# Patient Record
Sex: FEMALE | Race: WHITE
[De-identification: names, ages, dates, MRNs, and addresses within clinical notes are randomized per-mention and may not be internally consistent; named-entity substitution may affect disease eponyms.]

---

## 2017-01-10 ENCOUNTER — HOSPITAL ENCOUNTER (OUTPATIENT)
Dept: HOSPITAL 80 - FIMAGING | Age: 42
End: 2017-01-10
Attending: OBSTETRICS & GYNECOLOGY
Payer: COMMERCIAL

## 2017-01-10 DIAGNOSIS — E03.9: ICD-10-CM

## 2017-01-10 DIAGNOSIS — O09.522: Primary | ICD-10-CM

## 2017-01-10 DIAGNOSIS — O99.282: ICD-10-CM

## 2017-01-10 DIAGNOSIS — Z3A.20: ICD-10-CM

## 2017-01-10 NOTE — US
Dear Dr. Brady,

 

Thank you for sending your patient, Deanna Parker, to us for an US and consultation to assess fetal a
natomy. As you know, the patient is a 41 y.o. G4,  at 20 weeks and 3 days with an EDC of 5/27/17
 based on LMP and 10 week US. Her pregnancy is complicated by AMA>40 and hypothyroidism on Synthroid 
50 mcg daily. 

 

Genetic Screening: NIPT and AFP reassuring

 

The patient denies any uterine contractions, vaginal bleeding, or loss of fluid. Today, she is withou
t complaints.  

 

US FINDINGS:

 

Number of fetuses: 1

Placental location: Anterior, No previa

Placental Cord Insertion: Central

Fetal presentation: Breech

Cervix: 4.4 cm, transabdominally

MVP: 4.7 cm

The adnexa were evaluated. No pathology was seen.  

Right ovary: Normal

Left ovary: Suboptimal

 

Fetal heart rate:  143 bpm

 

Measurements:

 

Biparietal diameter: 48 mm, 20 weeks 4 days

Head circumference: 181 mm, 20 weeks 4 days

Abdominal circumference: 155 mm, 20 weeks 5 days

Femur length: 32 mm, 20 weeks 1 days

Humerus length: 32 mm, 20 weeks 4 days

Transcerebellar diameter: 23 mm, 21 weeks 5 days

 

Average ultrasound age: 20 weeks 4 days

 

Estimated fetal weight: 353 g

Fetal weight percentile: 45%

 

FETAL ANATOMY

 

Supratentorial brain: Normal

Cerebral lateral ventricle: 5 mm

Posterior fossa: Normal

Cisterna magna: 4 mm

Nuchal fold: 3.0 mm

Lip: Normal

Profile: Normal

Alveolar Ridge: Suboptimal

Spine:

-- Cervical: Normal

-- Thoracic: Normal

-- Lumbar: Normal

-- Sacral: Suboptimal

Heart:  

-- 4 Chamber: Normal

-- Intraventricular septum: Appears intact by Color and Spectral US

-- Right Outflow Tract: Normal

-- Left Outflow Tract: Normal

-- 3 Vessel View: Suboptimal

-- Aortic Arch: Normal

-- Ductal Arch: Normal

Diaphragm: Appears intact 

Stomach: Normal

Abdominal Umbilical Cord Insertion: Normal

Right kidney: Normal

Left kidney: Normal

Bladder: Normal

Number of cord vessels: 3

Upper extremities: 

-- Right Arm: Normal

-- Right Hand: Normal

-- Left Arm: Normal

-- Left Hand: Suboptimal

Lower extremities: 

-- Right Leg: Normal

-- Right Foot: Normal, no club foot

-- Left Leg: Normal

-- Left Foot: Normal, no club foot

 

IMPRESSION:

 

1.  Anatomy: The fetus measures appropriate for gestational age, measuring a normal weight and percen
tile. Visualization of the fetus today reveals no overt structural anomalies. However, the sacral spi
ne, 3VV, palate, and left hands were not well seen secondary to fetal position. There is evidence of 
normal amniotic fluid, and fetal movement was seen during the examination. 

- US in 4-6 weeks to reevaluate suboptimal anatomy

 

2. Genetic Screening: This patient has had reassuring NIPT results this pregnancy. Today, no markers 
of fetal aneuploidy were seen. While her screening and US results are reassuring, we reviewed that fe
koki aneuploidy can only be definitively excluded with diagnostic testing via amniocentesis. After thi
s discussion, the patient does not wish to proceed with invasive testing at this time.

 

3. Maternal Age >40 at Time of Delivery: We discussed the increased risk of preeclampsia, gestational
 diabetes, IUGR, and term IUFD. 

- Growth US at 28-32 weeks

- Weekly NSTs and fluid checks starting at 36 weeks

- Delivery by 40+0 weeks given term IUFD risk

- Consider early Glucola with repeat at 26-28 weeks if normal

- Close monitoring of blood pressures and urine protein in the third trimester

 

Thank you again for sending this patient to see us today. Approximately 12 minutes of a total visit t
bhupendra of 15 minutes were spent with this patient today in direct face to face counseling regarding yusuf
gaurav's US findings and the above recommendations.  

 

Please feel free to contact me with any questions at [phone].

 

Meghan Montes De Oca MD 

Maternal-Fetal Medicine

## 2017-01-11 NOTE — US
OB sonogram

 

History: HU May 27, 2017, 20 weeks 3 days, check growth and anatomy, advanced maternal age, maternal
 thyroid dysfunction on Synthroid

 

Comparison: none

 

Findings: There is a single viable intrauterine gestation in breech presentation. The cervix is close
d, measuring 4.4 cm transabdominally. The placenta is anterior without previa. The 3 vessel umbilical
 cord inserts normally into the central placenta. Maximum amniotic fluid pocket = 4.7 cm. The materna
l right ovary is normal the maternal left ovary is not visualized.

 

The visualized fetal face, intracranial contents and fetal spine look normal. The fetal heart is 4 ch
ambered and has a rate of 143 beats per minute. The right and left ventricular outflow tracks are nor
mal. Fluid is identified in the fetal stomach and fetal urinary bladder. Fetal renal region looks nor
mal. The umbilical cord insertion site is normal. Four fetal extremities are present.

 

BPD = 40 mm = 20 weeks 4 days

Head circumference = 181 mm = 20 weeks 4 days

Abdominal circumference = 155 mm = 20 weeks 5 days

Femur length = 32 mm = 20 weeks 1 day

Humeral length = 32 mm = 20 weeks 4 days

Cerebellar width = 23 mm = 21 weeks 5 days

Cisterna magna = 4.4 mm

Estimated fetal weight = 45 percentile

 

Impression: The fetus is in breech presentation. Size consistent with dates. Normal fetal anatomy.

 

This report should be read in conjunction with the consultation by Dr. Meghan Montes De Oca.

## 2017-02-15 ENCOUNTER — HOSPITAL ENCOUNTER (OUTPATIENT)
Dept: HOSPITAL 80 - FLD | Age: 42
Setting detail: OBSERVATION
Discharge: HOME | End: 2017-02-15
Attending: OBSTETRICS & GYNECOLOGY | Admitting: OBSTETRICS & GYNECOLOGY
Payer: COMMERCIAL

## 2017-02-15 DIAGNOSIS — R10.9: ICD-10-CM

## 2017-02-15 DIAGNOSIS — O9A.212: Primary | ICD-10-CM

## 2017-02-15 DIAGNOSIS — O09.522: ICD-10-CM

## 2017-02-15 DIAGNOSIS — Z3A.23: ICD-10-CM

## 2017-02-15 LAB
% IMMATURE GRANULYOCYTES: 1.2 % (ref 0–1.1)
ABSOLUTE IMMATURE GRANULOCYTES: 0.14 10^3/UL (ref 0–0.1)
ABSOLUTE NRBC COUNT: 0 10^3/UL (ref 0–0.01)
ADD DIFF?: NO
ADD MORPH?: NO
ADD SCAN?: NO
ATYPICAL LYMPHOCYTE FLAG: 0 (ref 0–99)
ERYTHROCYTE [DISTWIDTH] IN BLOOD BY AUTOMATED COUNT: 13.6 % (ref 11.5–15.2)
FRAGMENT RBC FLAG: 0 (ref 0–99)
HCT VFR BLD CALC: 34.3 % (ref 38–47)
HGB BLD-MCNC: 11.8 G/DL (ref 12.6–16.3)
LEFT SHIFT FLG: 10 (ref 0–99)
LIPEMIA HEMOLYSIS FLAG: 90 (ref 0–99)
MCH RBC BLDCO QN: 31.4 PG (ref 27.9–34.1)
MCHC RBC AUTO-ENTMCNC: 34.4 G/DL (ref 32.4–36.7)
MCV RBC AUTO: 91.2 FL (ref 81.5–99.8)
NRBC-AUTO%: 0 % (ref 0–0.2)
PLATELET # BLD: 195 10^3/UL (ref 150–400)
PLATELET CLUMPS FLAG: 0 (ref 0–99)
PMV BLD AUTO: 10.5 FL (ref 8.7–11.7)
RBC # BLD AUTO: 3.76 10^6/UL (ref 4.18–5.33)

## 2017-02-15 PROCEDURE — 76815 OB US LIMITED FETUS(S): CPT

## 2017-02-15 PROCEDURE — G0378 HOSPITAL OBSERVATION PER HR: HCPCS

## 2017-02-15 NOTE — SOAPPROG
SOAP Progress Note


Assessment/Plan: 


Assessment:


40 yo P1 @ 25 weeks gestation, s/p minor abdominal trauma, several hours ago, 

doing well.























Plan:





02/15/17 18:08


FWB reassuring by US and fetal monitoring.  Will call if KB results are positive

, however, if positive, management plan would continue the same which is 

monitor for vaginal bleeding, abdominal pain, contractions, loss of fluid.  

Plan on f/u appointment in two days.  


Subjective: 





41 P1 @ 25 weeks presents for fetal monitoring after tripping and falling this 

morning.  She hit her knee and elbow and head as well as the side of her 

abdomen.  The fetus has been moving throughout the day, she has had no vaginal 

bleeding or loss of fluid or abdominal pain.  She does state her abdomen feels 

sore on the side.  No major complications with this pregnancy other than 

history of recurrent pregnancy loss and advanced maternal age.


Objective: 





 Laboratory Results





 02/15/17 16:19 





VSS


[place]-no contractions


FHTs 150s, moderate variability, +accels, no decels





Physical Exam





- Physical Exam


General Appearance: no apparent distress


Abdomen: non-tender, soft, other (gravid)





ICD10 Worksheet


Patient Problems: 


 Problems











Problem Status Onset


 


AMA (advanced maternal age) multigravida 35+ Acute  


 


GBS (group B Streptococcus carrier), +RV culture, currently pregnant Acute  


 


Oligohydramnios Acute

## 2017-02-23 ENCOUNTER — HOSPITAL ENCOUNTER (OUTPATIENT)
Dept: HOSPITAL 80 - FIMAGING | Age: 42
End: 2017-02-23
Attending: OBSTETRICS & GYNECOLOGY
Payer: COMMERCIAL

## 2017-02-23 DIAGNOSIS — E03.9: ICD-10-CM

## 2017-02-23 DIAGNOSIS — O09.522: Primary | ICD-10-CM

## 2017-02-23 DIAGNOSIS — Z3A.26: ICD-10-CM

## 2017-04-11 ENCOUNTER — HOSPITAL ENCOUNTER (OUTPATIENT)
Dept: HOSPITAL 80 - FIMAGING | Age: 42
End: 2017-04-11
Attending: OBSTETRICS & GYNECOLOGY
Payer: COMMERCIAL

## 2017-04-11 DIAGNOSIS — O99.283: Primary | ICD-10-CM

## 2017-04-11 DIAGNOSIS — E03.9: ICD-10-CM

## 2017-04-11 DIAGNOSIS — O09.523: ICD-10-CM

## 2017-04-11 DIAGNOSIS — Z3A.33: ICD-10-CM

## 2017-05-22 ENCOUNTER — HOSPITAL ENCOUNTER (INPATIENT)
Dept: HOSPITAL 80 - FLD | Age: 42
LOS: 2 days | Discharge: HOME | End: 2017-05-24
Attending: OBSTETRICS & GYNECOLOGY | Admitting: OBSTETRICS & GYNECOLOGY
Payer: COMMERCIAL

## 2017-05-22 DIAGNOSIS — Z3A.39: ICD-10-CM

## 2017-05-22 DIAGNOSIS — O99.820: ICD-10-CM

## 2017-05-22 LAB
% IMMATURE GRANULYOCYTES: 1.5 % (ref 0–1.1)
ABSOLUTE IMMATURE GRANULOCYTES: 0.16 10^3/UL (ref 0–0.1)
ABSOLUTE NRBC COUNT: 0 10^3/UL (ref 0–0.01)
ADD DIFF?: NO
ADD MORPH?: NO
ADD SCAN?: NO
ATYPICAL LYMPHOCYTE FLAG: 10 (ref 0–99)
ERYTHROCYTE [DISTWIDTH] IN BLOOD BY AUTOMATED COUNT: 14.4 % (ref 11.5–15.2)
FRAGMENT RBC FLAG: 0 (ref 0–99)
HCT VFR BLD CALC: 37.3 % (ref 38–47)
HGB BLD-MCNC: 12.9 G/DL (ref 12.6–16.3)
LEFT SHIFT FLG: 0 (ref 0–99)
LIPEMIA HEMOLYSIS FLAG: 90 (ref 0–99)
MCH RBC BLDCO QN: 31.9 PG (ref 27.9–34.1)
MCHC RBC AUTO-ENTMCNC: 34.6 G/DL (ref 32.4–36.7)
MCV RBC AUTO: 92.1 FL (ref 81.5–99.8)
NRBC-AUTO%: 0 % (ref 0–0.2)
PLATELET # BLD: 122 10^3/UL (ref 150–400)
PLATELET CLUMPS FLAG: 10 (ref 0–99)
PMV BLD AUTO: 12.6 FL (ref 8.7–11.7)
RBC # BLD AUTO: 4.05 10^6/UL (ref 4.18–5.33)

## 2017-05-22 RX ADMIN — AMPICILLIN SODIUM SCH: 1 INJECTION, POWDER, FOR SOLUTION INTRAMUSCULAR; INTRAVENOUS at 20:01

## 2017-05-22 RX ADMIN — AMPICILLIN SODIUM SCH MLS: 2 INJECTION, POWDER, FOR SOLUTION INTRAMUSCULAR; INTRAVENOUS at 22:14

## 2017-05-22 RX ADMIN — IBUPROFEN PRN MG: 600 TABLET ORAL at 16:11

## 2017-05-22 NOTE — OBPROG
OBG Progress Note


Assessment/Plan: 


Assessment:


42 yo  @ 39 2, IOL for AMA over 40.


























Plan:





17 08:38


FWB reassuring.


GBS positive, s/p one dose abx.


Epidural as desired.


Subjective: 





Does not feel significant contractions, would like an epidural. 


Objective: 





 





 17 06:38 





 











Patient ABO/Rh  B POSITIVE   17  06:38    








vss





- SVE


Dilation (cm): 2


Effacement (%): 50


Station: -2


Current Contraction Pattern: Irregular


FHR (bpm): 130


FHR Pattern Variability: Moderate


FHR Category: 2


Membranes: AROM


Amniotic Fluid Color: Clear





ICD10 Worksheet


Patient Problems: 


 Problems











Problem Status Onset


 


Advanced maternal age (AMA), 40 years or greater Acute  


 


AMA (advanced maternal age) multigravida 35+ Acute  


 


Oligohydramnios Acute  


 


GBS (group B Streptococcus carrier), +RV culture, currently pregnant Acute

## 2017-05-22 NOTE — PREANESOB
Obstetric Pre-Anesthesia Info





- General Info


Proposed Procedure: Labor and delivery weith pitocin.


: 4


Para: 1


WBD: 39





- Fetal Info


Fetal Status: Full Term


Fetal Monitors: External


FHR Baseline (bpm): 140


FHR Pattern: Reassuring





- Labor Status


Cervical Dilation per last OB SVE: 7


Station per last OB SVE: 0


Amniotic Fluid Color: Clear


Pitocin: In Use


Indications for Labor Analgesia: Induction of Labor, Pain Control





Anesthesia


ROS: 


Induction for advanced maternal age. Prior labor epidural with difficulty.


Allergies/Adverse Reactions: 


 











Allergy/AdvReac Type Severity Reaction Status Date / Time


 


No Known Allergies Allergy   Unverified 13 09:26











Home Medications: 














 Medication  Instructions  Recorded


 


Calcium [HI-MIKE] 1 tab PO DAILY20 14


 


Magnesium Oxide [Magnesium] 1 cap PO DAILY20 14


 


Prenatal Vit27&Calcium/Iron/FA 1 tab PO DAILY20 14





[Prenatal]  











Visit Medications: 





 











Generic Name Dose Route Start Last Admin





  Trade Name Freq  PRN Reason Stop Dose Admin


 


Diphenhydramine HCl  25 - 50 mg  17 13:55  





  Benadryl Injection  IVP  17 13:54  





  Q6HRS PRN   





  Itching   


 


Ampicillin Sodium 1 gm/ Sodium  100 mls @ 200 mls/hr  17 10:48  





  Chloride  IV  17 10:47  





  Q4H TEDDY   





  Protocol   


 


Lactated Ringer's  1,000 mls @ 0 mls/hr  17 06:44  





  Lr  IV  17 06:43  





  PRN PRN   





  SEE PROTOCOL CONDITIONS   





  Protocol   





  Per Protocol   


 


Oxytocin/Lactated Ringer's  1,000 mls @ 150 mls/hr  17 06:44  





  Pitocin 20 Units/Lr (Premix)  IV   





  PRN PRN   





  Post-partum bleeding   


 


Oxytocin/Lactated Ringer's  500 mls @ 0 mls/hr  17 07:00  17 07:47





  Pitocin 30 Units/Lr (Premix)  IV  17 06:59  500 mls





  CONT TEDDY   Administration





  Protocol   





  Per Protocol   


 


Fentanyl/Bupivacaine HCl  100 mls @ 0 mls/hr  17 14:00  





  Fentanyl/Bupivacaine/Ns 2 Mcg/Ml 0.1% (Premix  EP  17 13:59  





  CONT TEDDY   





  Protocol   





  As Directed   


 


Lactated Ringer's  500 mls @ 0 mls/hr  17 14:00  





  Lr  IV  17 13:59  





  CONT TEDDY   





  As Directed   


 


Ibuprofen  600 mg  17 06:44  





  Motrin  PO  17 06:43  





  Q6HRS PRN   





  post partum, inflammation   


 


Lidocaine HCl  30 ml  17 06:44  





  Lidocaine Hcl 1%  SC  17 06:43  





  ONCE PRN   





  Episiotomy    


 


Magnesium Sulfate  454 gm  17 06:44  





  Epsom Salt  TP  17 06:43  





  PRN PRN   





  perineal discomfort    


 


Olive Oil  118 ml  17 06:44  





  Sweet Oil  MISC  17 06:43  





  ONCE PRN   





  preneal massage   


 


Ondansetron HCl  4 mg  17 13:55  





  Zofran  IVP  17 13:54  





  Q4HRS PRN   





  Nausea/Vomiting, Can't Take PO   


 


Phenylephrine HCl  100 mcg  17 13:55  





  Kishor-Synephrine  IVP  17 13:54  





  .Q2M PRN   





  Hypotension   


 


Terbutaline Sulfate  0.25 mg  17 06:44  





  Brethine  IV  17 06:43  





  ONCE PRN   





  Tachysystole   














Discontinued Medications














Generic Name Dose Route Start Last Admin





  Trade Name Arnulfo  PRN Reason Stop Dose Admin


 


Ammonia (Aromatic Spirit)  Confirm  17 07:54  





  Ammonia Aromatic  Administered  17 07:55  





  Dose   





  1 each   





  IH   





  .STK-MED ONE   


 


Bupivacaine HCl  Confirm  17 11:08  





  Sensorcaine 0.25% Sdv  Administered  17 11:09  





  Dose   





  30 ml   





  .ROUTE   





  .STK-MED ONE   


 


Carboprost Tromethamine  Confirm  17 13:14  





  Hemabate  Administered  17 13:15  





  Dose   





  250 mcg   





  IM   





  .STK-MED ONE   


 


Ephedrine Sulfate  Confirm  17 07:55  





  Ephedrine Sulfate  Administered  17 07:56  





  Dose   





  50 mg   





  .ROUTE   





  .STK-MED ONE   


 


Fentanyl  Confirm  17 11:08  





  Sublimaze  Administered  17 11:09  





  Dose   





  100 mcg   





  .ROUTE   





  .STK-MED ONE   


 


Fentanyl/Bupivacaine HCl  Confirm  17 11:08  





  Fentanyl/Bupivacaine/Ns 2 Mcg/Ml 0.1% (Premix  Administered  17 11:09  





  Dose   





  100 ml   





  EP   





  .STK-MED ONE   


 


Ampicillin Sodium 2 gm/ Sodium  110 mls @ 220 mls/hr  17 06:44  17 

09:07





  Chloride  IV  17 07:13  110 mls





  ONCE ONE   Administration





  Protocol   


 


Lidocaine HCl  Confirm  17 07:54  





  Lidocaine Hcl 1%  Administered  17 07:55  





  Dose   





  30 ml   





  .ROUTE   





  .STK-MED ONE   


 


Methylergonovine Maleate  Confirm  17 13:14  





  Methergine  Administered  17 13:15  





  Dose   





  0.2 mg   





  .ROUTE   





  .STK-MED ONE   


 


Misoprostol  Confirm  17 07:55  





  Cytotec  Administered  17 07:56  





  Dose   





  800 mcg   





  .ROUTE   





  .STK-MED ONE   


 


Olive Oil  Confirm  17 07:54  





  Sweet Oil  Administered  17 07:55  





  Dose   





  118 ml   





  .ROUTE   





  .STK-MED ONE   


 


Oxytocin  Confirm  17 07:55  





  Pitocin  Administered  17 07:56  





  Dose   





  40 unit   





  .ROUTE   





  .STK-MED ONE   


 


Phenylephrine HCl  Confirm  17 11:08  





  Kishor-Synephrine  Administered  17 11:09  





  Dose   





  1,000 mcg   





  .ROUTE   





  .STK-MED ONE   


 


Terbutaline Sulfate  Confirm  17 07:55  





  Brethine  Administered  17 07:56  





  Dose   





  1 mg   





  .ROUTE   





  .STK-MED ONE   














- Anesthesia History


Response to Local Anesthetics: Normal


Anesthesia & Operative History: Prob w/Prior Anesthesia





- Social History


Substance Use/Abuse: Denies (Difficulty getting desired analgesia with previous 

epidural.)





- Focused Exam


Blood Pressure: 111/58


Heart Rate: 99


Respiratory Rate: 18


Height/Weight (Nursing): 





 











Height                         165.1 cm


 


Weight                         74.843 kg














Physical Exam: 


Within normal limits.


ASA Status: II


Labs: 





 





 17 06:38 





 











Patient ABO/Rh  B POSITIVE   17  06:38    














- Plan


Anesthetic Plan: CSE


Consent Signed and on Chart: Yes


Patient/Guardian Understands and Agrees to Plan: Yes

## 2017-05-22 NOTE — POSTANESTH
Post Anesthetic Evaluation


Cardiovascular Status: Normal, Stable


Respiratory Status: Normal, Stable, Similar to Pre-op Cond.


Level of Consciousness/Mental Status: Can Participate in Eval, Alert and 

Oriented (Tolerated CSE well, stable after phenylephrine x 1, comfortable.)


Pain Control: Adequate, Prn Tx Ordered


Nausea/Vomiting Control: Adequate, Prn Tx Ordered


Complications Possibly Related to Anesthesia: None Noted

## 2017-05-22 NOTE — SOAPPROG
SOAP Progress Note


Assessment/Plan: 


Assessment:


42 yo  S/P  with manual extraction of placenta due to retained 

placenta, now with fever, likely endometritis


























Plan:





Bleeding stable, will check HCT in am.


Amp/Gent for probable endometritis, will add clindamycin if continues to have a 

fever.








17 18:08





Subjective: 





42 yo  S/P  with manual extraction of placenta due to retained 

placenta, now with fever, likely endometritis-feels tired, no signficant pain.


Objective: 





 Vital Signs











Temp Pulse Resp BP Pulse Ox


 


    99   18   111/58 L   


 


    17 14:04  17 14:04  17 14:04   








 Laboratory Results





 17 06:38 





VSS





Physical Exam





- Physical Exam


Abdomen: non-tender, other (uterus firm, 4 cm below umbilicus)





ICD10 Worksheet


Patient Problems: 


 Problems











Problem Status Onset


 


Advanced maternal age (AMA), 40 years or greater Acute  


 


AMA (advanced maternal age) multigravida 35+ Acute  


 


GBS (group B Streptococcus carrier), +RV culture, currently pregnant Acute  


 


Oligohydramnios Acute

## 2017-05-22 NOTE — OBPROC
- Labor and Delivery


Onset of Contractions Date: 17


Onset of Contractions Time: 08:00


Onset of Contractions Type: Induced


Rupture of Membranes Date: 17


Rupture of Membranes Time: 08:30


Rupture of Membranes Type: Artificial


Amniotic Fluid Color: Clear


Dilation Complete Time: 13:15


Delivery Type: Spontaneous


Placenta Delivery Date: 17


EBL: 800 ml


Complications: Post Partum Hemorrhage, Other (Specify) (retained placenta 

requiring manual extraction and banjo curette, US at end of procedure showed 

clear endometrial stripe)





- Medications


Labor Augmentation/Induction Meds Used: Pitocin


Labor Augmentation/Induction Indication: Other (Specify) (advanced maternal age

, elective)


Anesthesia: Epidural





- [place] Info


  ** Infant A


Delivery Date: 17


Delivery Time: 13:29


Sex of Infant: Female


Apgar Score (1 Min): 8


Apgar Score (5 Min): 9 (The patient has a significant history of retained 

placenta with her first birth.  After 10 minutes after delivery of the infant, 

the placenta was showing no signs of delivery.  Manual extraction revealed an 

adherent placenta, requiring banjo curette for complete removal.  The patient 

received pitocin, methergine, hemabate and cytotec during delivery for uterine 

atony.  US was done to confirm no retained placenta.)

## 2017-05-22 NOTE — OBPROG
OBG Progress Note


Assessment/Plan: 


Assessment:


40 yo  @ 39 2/7, IOL for AMA over 40.


























Plan:





FWB reassuring.


GBS positive, s/p two doses abx.


Expect .


17 12:58





Subjective: 





40 yo  @ 39 2/7, IOL for AMA over 40.





Objective: 





 





 17 06:38 





 











Patient ABO/Rh  B POSITIVE   17  06:38    








VSS





- SVE


Dilation (cm): 7


Effacement (%): 100


Station: 0


Current Contraction Pattern: Regular


FHR (bpm): 130


FHR Pattern Variability: Moderate


FHR Category: 2


Membranes: AROM


Amniotic Fluid Color: Clear





ICD10 Worksheet


Patient Problems: 


 Problems











Problem Status Onset


 


Advanced maternal age (AMA), 40 years or greater Acute  


 


AMA (advanced maternal age) multigravida 35+ Acute  


 


GBS (group B Streptococcus carrier), +RV culture, currently pregnant Acute  


 


Oligohydramnios Acute

## 2017-05-23 VITALS — RESPIRATION RATE: 18 BRPM

## 2017-05-23 RX ADMIN — IBUPROFEN PRN MG: 600 TABLET ORAL at 22:22

## 2017-05-23 RX ADMIN — DOCUSATE SODIUM PRN MG: 100 CAPSULE, LIQUID FILLED ORAL at 08:36

## 2017-05-23 RX ADMIN — DOCUSATE SODIUM PRN MG: 100 CAPSULE, LIQUID FILLED ORAL at 00:02

## 2017-05-23 RX ADMIN — Medication SCH MG: at 10:27

## 2017-05-23 RX ADMIN — IBUPROFEN PRN MG: 600 TABLET ORAL at 16:48

## 2017-05-23 RX ADMIN — Medication SCH MG: at 22:22

## 2017-05-23 RX ADMIN — DOCUSATE SODIUM PRN MG: 100 CAPSULE, LIQUID FILLED ORAL at 22:22

## 2017-05-23 RX ADMIN — IBUPROFEN PRN MG: 600 TABLET ORAL at 00:02

## 2017-05-23 RX ADMIN — AMPICILLIN SODIUM SCH MLS: 2 INJECTION, POWDER, FOR SOLUTION INTRAMUSCULAR; INTRAVENOUS at 10:22

## 2017-05-23 RX ADMIN — AMPICILLIN SODIUM SCH MLS: 2 INJECTION, POWDER, FOR SOLUTION INTRAMUSCULAR; INTRAVENOUS at 04:54

## 2017-05-23 RX ADMIN — IBUPROFEN PRN MG: 600 TABLET ORAL at 08:36

## 2017-05-23 NOTE — SOAPPROG
SOAP Progress Note


Assessment/Plan: 


Assessment:


 41 y.o.  female s/p  with manual removal of placenta and 

endometritis. Baugh and IV remains in place. Reports good pain control and 

minimal vaginal bleeding. Reports increased movement in her legs and was able 

to be out of bed to bathroom.























Plan:


Routine postpartum care. Lactation consult. D/C baugh and encourage ambulation. 

BC IV after last dose of ampicillin. Continue to monitor for S&S of infection.


17 08:01





Subjective: 





 Reports feeling fatigued with good pain control. Minimal vaginal bleeding. 

Reports noticing increased sensation in her legs and has been able to ambulate 

to bathroom. Eating and drinking well without nausea or vomiting. Appropriate 

mood with good support system.


Objective: 





 Vital Signs











Temp Pulse Resp BP Pulse Ox


 


 36.6 C   80   20   94/59 L  93 


 


 17 00:10  17 00:10  17 00:10  17 00:10  17 00:10








 Laboratory Results





 17 04:58 





 











 17





 05:59 05:59 05:59


 


Intake Total  1110 


 


Output Total  3350 1600


 


Balance  -2240 -1600














- Time Spent With Patient


Time Spent With Patient: 





20 minutes





- Pending Discharge


Pending Discharge Within 24 Hours: Yes


Pending Discharge Date: 17


Pending Discharge Time: 11:00





Physical Exam





- Physical Exam


General Appearance: WD/WN, alert, no apparent distress


EENT: normal ENT inspection


Neck: non-tender, full range of motion, normal inspection


Respiratory: lungs clear, normal breath sounds


Cardiac/Chest: regular rate, rhythm


Abdomen: non-tender, soft


Pelvic Exam: normal external exam


Rectal: deferred


Back: Normal inspection


Skin: normal color, warm/dry


Lymphatic: no adenopathy


Extremities: normal range of motion, non-tender, normal inspection


Neuro/Psych: alert, normal mood/affect, oriented x 3





ICD10 Worksheet


Patient Problems: 


 Problems











Problem Status Onset


 


Advanced maternal age (AMA), 40 years or greater Acute  


 


AMA (advanced maternal age) multigravida 35+ Acute  


 


GBS (group B Streptococcus carrier), +RV culture, currently pregnant Acute  


 


Oligohydramnios Acute

## 2017-05-24 VITALS
SYSTOLIC BLOOD PRESSURE: 107 MMHG | DIASTOLIC BLOOD PRESSURE: 61 MMHG | TEMPERATURE: 98 F | HEART RATE: 84 BPM | OXYGEN SATURATION: 93 %

## 2017-05-24 RX ADMIN — IBUPROFEN PRN MG: 600 TABLET ORAL at 08:30

## 2017-05-24 RX ADMIN — Medication SCH: at 12:02

## 2017-05-30 NOTE — PQFORM
PHYSICIAN QUERY FORM

 

 Needs Your Response





This query form is being sent to you to assure this patient record is coded 
properly.  Please respond to the question below:



 QUESTION:

Aimee,

In the progress notes on this patient, it is mentioned that she had postpartum 
endometritis.  Can this diagnosis be added to the discharge diagnoses?



Yes  ______



No   ______



Other  ___________



Thank you,

Bettie Salazar, RYAN

HIM Coding

[phone] 





INSTRUCTIONS FOR RESPONSE:



Answer question by clicking on the "Edit Document" button.

Move cursor to area below the stars.

When complete, hit "Save."

Click on the "Sign" button, then click "Sign" again.

Type in your PIN and hit "Enter."



****************

MTDD

## 2017-06-12 NOTE — PQFORM
PHYSICIAN QUERY FORM

 

 Needs Your Response





This query form is being sent to you to assure this patient record is coded 
properly.  Please respond to the question below:



 QUESTION:

Dr. Brady.

In the progress notes on this patient, it is mentioned that she had postpartum 
endometritis.  Can this diagnosis be added to the discharge diagnoses on the 
discharge summary?



Yes __x_____



No  _______



Other   ______________



Thank you,

BEBETO Ngo

HIM Coding

[phone]





INSTRUCTIONS FOR RESPONSE:



Answer question by clicking on the "Edit Document" button.

Move cursor to area below the stars.

When complete, hit "Save."

Click on the "Sign" button, then click "Sign" again.

Type in your PIN and hit "Enter."



****************

MTDD

## 2018-11-01 ENCOUNTER — HOSPITAL ENCOUNTER (OUTPATIENT)
Dept: HOSPITAL 80 - FIMAGING | Age: 43
End: 2018-11-01
Attending: OBSTETRICS & GYNECOLOGY
Payer: COMMERCIAL

## 2018-11-01 DIAGNOSIS — Z12.31: Primary | ICD-10-CM

## 2018-11-01 DIAGNOSIS — Z80.3: ICD-10-CM
